# Patient Record
Sex: MALE | Race: WHITE | NOT HISPANIC OR LATINO | Employment: OTHER | URBAN - METROPOLITAN AREA
[De-identification: names, ages, dates, MRNs, and addresses within clinical notes are randomized per-mention and may not be internally consistent; named-entity substitution may affect disease eponyms.]

---

## 2018-10-10 ENCOUNTER — OFFICE VISIT (OUTPATIENT)
Dept: URGENT CARE | Facility: CLINIC | Age: 68
End: 2018-10-10
Payer: MEDICARE

## 2018-10-10 VITALS
SYSTOLIC BLOOD PRESSURE: 130 MMHG | BODY MASS INDEX: 34.36 KG/M2 | OXYGEN SATURATION: 96 % | HEIGHT: 70 IN | WEIGHT: 240 LBS | DIASTOLIC BLOOD PRESSURE: 84 MMHG | RESPIRATION RATE: 16 BRPM | HEART RATE: 68 BPM | TEMPERATURE: 98.1 F

## 2018-10-10 DIAGNOSIS — M10.9 ACUTE GOUT INVOLVING TOE OF LEFT FOOT, UNSPECIFIED CAUSE: ICD-10-CM

## 2018-10-10 PROCEDURE — 99204 OFFICE O/P NEW MOD 45 MIN: CPT | Performed by: NURSE PRACTITIONER

## 2018-10-10 RX ORDER — PRAVASTATIN SODIUM 10 MG
TABLET ORAL
COMMUNITY
Start: 2018-10-02

## 2018-10-10 RX ORDER — KETOROLAC TROMETHAMINE 30 MG/ML
60 INJECTION, SOLUTION INTRAMUSCULAR; INTRAVENOUS ONCE
Status: COMPLETED | OUTPATIENT
Start: 2018-10-10 | End: 2018-10-10

## 2018-10-10 RX ORDER — GABAPENTIN 100 MG/1
CAPSULE ORAL
COMMUNITY
Start: 2018-07-21

## 2018-10-10 RX ORDER — LISINOPRIL 20 MG/1
TABLET ORAL
COMMUNITY
Start: 2018-08-15

## 2018-10-10 RX ORDER — COLCHICINE 0.6 MG/1
TABLET ORAL
Qty: 15 TAB | Refills: 0 | Status: SHIPPED | OUTPATIENT
Start: 2018-10-10

## 2018-10-10 RX ADMIN — KETOROLAC TROMETHAMINE 60 MG: 30 INJECTION, SOLUTION INTRAMUSCULAR; INTRAVENOUS at 18:53

## 2018-10-10 ASSESSMENT — ENCOUNTER SYMPTOMS
FEVER: 0
MYALGIAS: 1
FALLS: 0
TINGLING: 0
SENSORY CHANGE: 0
CHILLS: 0
WEAKNESS: 0
BRUISES/BLEEDS EASILY: 0

## 2018-10-11 NOTE — PROGRESS NOTES
"Subjective:      Tom Carmona is a 68 y.o. male who presents with Gout ((L) toe gout flareup, x1day. travelling from [place])            HPI  Tom is here for acute left big toe redness, swelling and pain. Wife present. H/o gout. Traveling through town, lives in [place]. Leaves for [place] tomorrow. Soaking in Epsom salts. Out of Indomethacin. Denies trauma, injury or fall.    PMH:  has no past medical history on file.  MEDS:   Current Outpatient Prescriptions:   •  gabapentin (NEURONTIN) 100 MG Cap, , Disp: , Rfl:   •  lisinopril (PRINIVIL) 20 MG Tab, , Disp: , Rfl:   •  pravastatin (PRAVACHOL) 10 MG Tab, TAKE ONE TABLET BY MOUTH ONCE DAILY, Disp: , Rfl:   •  colchicine (COLCRYS) 0.6 MG Tab, Take 2 tabs by mouth then repeat 1 tab 1 hour later, then continue once daily until resolved., Disp: 15 Tab, Rfl: 0  ALLERGIES:   Allergies   Allergen Reactions   • Hydrocodone-Acetaminophen Nausea     Ok with acetaminophen   • Tramadol Nausea     SURGHX: History reviewed. No pertinent surgical history.  SOCHX:  reports that he has never smoked. He has never used smokeless tobacco. He reports that he does not drink alcohol or use drugs.  FH: Family history was reviewed, no pertinent findings to report    Review of Systems   Constitutional: Negative for chills, fever and malaise/fatigue.   Cardiovascular: Negative for leg swelling.   Musculoskeletal: Positive for joint pain and myalgias. Negative for falls.   Skin: Negative for itching and rash.   Neurological: Negative for tingling, sensory change and weakness.   Endo/Heme/Allergies: Does not bruise/bleed easily.   All other systems reviewed and are negative.         Objective:     /84   Pulse 68   Temp 36.7 °C (98.1 °F)   Resp 16   Ht 1.778 m (5' 10\")   Wt 108.9 kg (240 lb)   SpO2 96%   BMI 34.44 kg/m²      Physical Exam   Constitutional: He is oriented to person, place, and time. Vital signs are normal. He appears well-developed and well-nourished. He is active " and cooperative.  Non-toxic appearance. He does not have a sickly appearance. He does not appear ill. No distress.   HENT:   Head: Normocephalic.   Cardiovascular: Normal rate.    Pulmonary/Chest: Effort normal.   Musculoskeletal: He exhibits edema and tenderness. He exhibits no deformity.        Left foot: There is tenderness, bony tenderness and swelling. There is normal range of motion, normal capillary refill, no crepitus and no deformity.   Feet:   Left Foot:   Protective Sensation: 10 sites tested. 10 sites sensed.   Skin Integrity: Positive for erythema and warmth. Negative for ulcer, blister, skin breakdown, callus or dry skin.   Neurological: He is alert and oriented to person, place, and time.   Skin: Skin is warm and dry. He is not diaphoretic. There is erythema.   Vitals reviewed.              Assessment/Plan:     1. Acute gout involving toe of left foot, unspecified cause    - colchicine (COLCRYS) 0.6 MG Tab; Take 2 tabs by mouth then repeat 1 tab 1 hour later, then continue once daily until resolved.  Dispense: 15 Tab; Refill: 0  - ketorolac (TORADOL) injection 60 mg; 2 mL by Intramuscular route Once.    Avoid triggers for flare ups like alcohol, seafood, meat, processed foods  Maintain a healthy weight  Include the following in diet to decrease gout flare-ups:  ?Low-fat dairy products  ?Foods made with complex carbohydrates (whole grains, brown rice, oats, beans)  ?Only a moderate amount of wine (up to two 5-ounce servings per day [about 300 mL per day] since this is not likely to increase the risk of a gout attack)  ?Coffee (may decrease serum uric acid levels)  ?Vitamin C (500 mg per day has a mild urate-lowering effect (UP-TO-DATE)